# Patient Record
Sex: MALE | Race: WHITE | NOT HISPANIC OR LATINO | URBAN - METROPOLITAN AREA
[De-identification: names, ages, dates, MRNs, and addresses within clinical notes are randomized per-mention and may not be internally consistent; named-entity substitution may affect disease eponyms.]

---

## 2017-10-16 ENCOUNTER — OFFICE VISIT (OUTPATIENT)
Dept: RETAIL CLINIC | Facility: CLINIC | Age: 22
End: 2017-10-16

## 2017-10-16 VITALS
HEART RATE: 86 BPM | HEIGHT: 74 IN | TEMPERATURE: 98.2 F | WEIGHT: 249 LBS | OXYGEN SATURATION: 99 % | SYSTOLIC BLOOD PRESSURE: 110 MMHG | BODY MASS INDEX: 31.95 KG/M2 | DIASTOLIC BLOOD PRESSURE: 72 MMHG | RESPIRATION RATE: 20 BRPM

## 2017-10-16 DIAGNOSIS — J40 BRONCHITIS: ICD-10-CM

## 2017-10-16 DIAGNOSIS — J01.00 ACUTE MAXILLARY SINUSITIS, RECURRENCE NOT SPECIFIED: Primary | ICD-10-CM

## 2017-10-16 PROCEDURE — 99213 OFFICE O/P EST LOW 20 MIN: CPT | Performed by: NURSE PRACTITIONER

## 2017-10-16 RX ORDER — ALBUTEROL SULFATE 90 UG/1
2 AEROSOL, METERED RESPIRATORY (INHALATION) EVERY 4 HOURS PRN
COMMUNITY

## 2017-10-16 RX ORDER — PANTOPRAZOLE SODIUM 40 MG/1
40 TABLET, DELAYED RELEASE ORAL DAILY
COMMUNITY

## 2017-10-16 RX ORDER — AMOXICILLIN AND CLAVULANATE POTASSIUM 875; 125 MG/1; MG/1
1 TABLET, FILM COATED ORAL 2 TIMES DAILY
Qty: 20 TABLET | Refills: 0 | Status: SHIPPED | OUTPATIENT
Start: 2017-10-16 | End: 2017-10-26

## 2017-10-16 NOTE — PATIENT INSTRUCTIONS
Acute Bronchitis  Bronchitis is when the airways that extend from the windpipe into the lungs get red, puffy, and painful (inflamed). Bronchitis often causes thick spit (mucus) to develop. This leads to a cough. A cough is the most common symptom of bronchitis.  In acute bronchitis, the condition usually begins suddenly and goes away over time (usually in 2 weeks). Smoking, allergies, and asthma can make bronchitis worse. Repeated episodes of bronchitis may cause more lung problems.  HOME CARE  · Rest.  · Drink enough fluids to keep your pee (urine) clear or pale yellow (unless you need to limit fluids as told by your doctor).  · Only take over-the-counter or prescription medicines as told by your doctor.  · Avoid smoking and secondhand smoke. These can make bronchitis worse. If you are a smoker, think about using nicotine gum or skin patches. Quitting smoking will help your lungs heal faster.  · Reduce the chance of getting bronchitis again by:    Washing your hands often.    Avoiding people with cold symptoms.    Trying not to touch your hands to your mouth, nose, or eyes.  · Follow up with your doctor as told.  GET HELP IF:  Your symptoms do not improve after 1 week of treatment. Symptoms include:  · Cough.  · Fever.  · Coughing up thick spit.  · Body aches.  · Chest congestion.  · Chills.  · Shortness of breath.  · Sore throat.  GET HELP RIGHT AWAY IF:   · You have an increased fever.  · You have chills.  · You have severe shortness of breath.  · You have bloody thick spit (sputum).  · You throw up (vomit) often.  · You lose too much body fluid (dehydration).  · You have a severe headache.  · You faint.  MAKE SURE YOU:   · Understand these instructions.  · Will watch your condition.  · Will get help right away if you are not doing well or get worse.     This information is not intended to replace advice given to you by your health care provider. Make sure you discuss any questions you have with your health care  provider.     Document Released: 06/05/2009 Document Revised: 08/20/2014 Document Reviewed: 06/10/2014  [employer] Interactive Patient Education ©2017 [employer]  Sinusitis, Adult  Sinusitis is soreness and inflammation of your sinuses. Sinuses are hollow spaces in the bones around your face. They are located:  · Around your eyes.  · In the middle of your forehead.  · Behind your nose.  · In your cheekbones.  Your sinuses and nasal passages are lined with a stringy fluid (mucus). Mucus normally drains out of your sinuses. When your nasal tissues get inflamed or swollen, the mucus can get trapped or blocked so air cannot flow through your sinuses. This lets bacteria, viruses, and funguses grow, and that leads to infection.  HOME CARE  Medicines  · Take, use, or apply over-the-counter and prescription medicines only as told by your doctor. These may include nasal sprays.  · If you were prescribed an antibiotic medicine, take it as told by your doctor. Do not stop taking the antibiotic even if you start to feel better.  Hydrate and Humidify  · Drink enough water to keep your pee (urine) clear or pale yellow.  · Use a cool mist humidifier to keep the humidity level in your home above 50%.  · Breathe in steam for 10-15 minutes, 3-4 times a day or as told by your doctor. You can do this in the bathroom while a hot shower is running.  · Try not to spend time in cool or dry air.  Rest  · Rest as much as possible.    · Sleep with your head raised (elevated).  · Make sure to get enough sleep each night.  General Instructions  · Put a warm, moist washcloth on your face 3-4 times a day or as told by your doctor. This will help with discomfort.  · Wash your hands often with soap and water. If there is no soap and water, use hand .  · Do not smoke. Avoid being around people who are smoking (secondhand smoke).  · Keep all follow-up visits as told by your doctor. This is important.  GET HELP IF:  · You have a  fever.  · Your symptoms get worse.  · Your symptoms do not get better within 10 days.  GET HELP RIGHT AWAY IF:  · You have a very bad headache.  · You cannot stop throwing up (vomiting).  · You have pain or swelling around your face or eyes.  · You have trouble seeing.  · You feel confused.  · Your neck is stiff.  · You have trouble breathing.     This information is not intended to replace advice given to you by your health care provider. Make sure you discuss any questions you have with your health care provider.     Document Released: 06/05/2009 Document Revised: 04/10/2017 Document Reviewed: 10/12/2016  [employer] Interactive Patient Education ©2017 [employer]    Medications and plan of care reviewed with patient and teaching done on medication reactions/side effects. Continue Flonase and inhaler as prescribed.  Rest, plenty of fluids, comfort measures, humidifier, warm salt water gargles, and follow up with PCP if symptoms persist.  If worse go to urgent care or ER as discussed.

## 2017-10-16 NOTE — PROGRESS NOTES
Subjective   Flako Walker is a 21 y.o. male presents for sinus infection and possible bronchitis.   has been sick for over 3 weeks.   went to ER about 2 weeks ago, had CXR, and was diagnosed with sinus infection.   was given steroids and zpak that he completed.   got a little better but not completely.   symptoms has come back worse in sinuses.   lungs are better with inhaler, but still having some cough.   has been taking OTC cold medicine, Flonase, inhaler, and mucinex with little relief. States that he thinks still has a sinus infection and needs a stronger antibiotic.    Sinusitis   This is a new problem. The current episode started 1 to 4 weeks ago. The problem has been gradually worsening since onset. Maximum temperature: low grade . The fever has been present for 3 to 4 days. His pain is at a severity of 6/10. The pain is moderate. Associated symptoms include chills, congestion, coughing (mostly dry with little production), ear pain (bilat ear pain/pressure), headaches (frontal), sinus pressure and a sore throat. Pertinent negatives include no hoarse voice, neck pain, shortness of breath, sneezing or swollen glands. Treatments tried: OTC cold medicine, Flonase, inhaler, and mucinex, took antibiotic and steroids recently. The treatment provided mild relief.   Cough   This is a new problem. The current episode started 1 to 4 weeks ago. The problem has been gradually worsening. The cough is non-productive. Associated symptoms include chills, ear congestion, ear pain (bilat ear pain/pressure), a fever (low grade ,), headaches (frontal), nasal congestion, postnasal drip, rhinorrhea, a sore throat and wheezing. Pertinent negatives include no chest pain, heartburn, hemoptysis, myalgias, rash or shortness of breath. Nothing aggravates the symptoms. Risk factors for lung disease include smoking/tobacco exposure. Treatments tried: OTC cold medicine, Flonase,  inhaler, and mucinex. The treatment provided mild relief. His past medical history is significant for asthma.        The following portions of the patient's history were reviewed and updated as appropriate: allergies, current medications, past family history, past medical history, past social history and past surgical history.    Review of Systems   Constitutional: Positive for chills and fever (low grade ,). Negative for unexpected weight change.   HENT: Positive for congestion, ear pain (bilat ear pain/pressure), postnasal drip, rhinorrhea, sinus pain, sinus pressure and sore throat. Negative for ear discharge, hoarse voice, sneezing, trouble swallowing and voice change.    Eyes: Negative.    Respiratory: Positive for cough (mostly dry with little production) and wheezing. Negative for hemoptysis, chest tightness, shortness of breath and stridor.    Cardiovascular: Negative.  Negative for chest pain.   Gastrointestinal: Negative.  Negative for abdominal pain, constipation, diarrhea, heartburn, nausea and vomiting.   Genitourinary: Negative.    Musculoskeletal: Negative.  Negative for myalgias and neck pain.   Skin: Negative.  Negative for rash.   Neurological: Positive for headaches (frontal).       Objective   Physical Exam   Constitutional: He is oriented to person, place, and time. He appears well-developed and well-nourished. No distress.   HENT:   Head: Normocephalic and atraumatic.   Right Ear: External ear and ear canal normal. Tympanic membrane is not erythematous. A middle ear effusion is present.   Left Ear: External ear and ear canal normal. Tympanic membrane is not erythematous. A middle ear effusion is present.   Nose: Mucosal edema and sinus tenderness present. Right sinus exhibits maxillary sinus tenderness. Left sinus exhibits maxillary sinus tenderness.   Mouth/Throat: Uvula is midline and mucous membranes are normal. No uvula swelling. Posterior oropharyngeal erythema (mild erythema with  PND) present. Tonsils are 0 on the right. Tonsils are 0 on the left. No tonsillar exudate.   Eyes: Conjunctivae and lids are normal. Pupils are equal, round, and reactive to light.   Neck: Normal range of motion.   Cardiovascular: Normal rate, regular rhythm and normal heart sounds.    No murmur heard.  Pulmonary/Chest: Effort normal and breath sounds normal. No respiratory distress. He has no wheezes.   Lymphadenopathy:     He has no cervical adenopathy.   Neurological: He is alert and oriented to person, place, and time.   Skin: Skin is warm and dry. No rash noted.   Psychiatric: He has a normal mood and affect. His speech is normal and behavior is normal. Judgment and thought content normal.       Assessment/Plan   Flako was seen today for sinusitis and cough.    Diagnoses and all orders for this visit:    Acute maxillary sinusitis, recurrence not specified  Bronchitis  -     amoxicillin-clavulanate (AUGMENTIN) 875-125 MG per tablet; Take 1 tablet by mouth 2 (Two) Times a Day for 10 days.  -     loratadine-pseudoephedrine (CLARITIN-D 24 HOUR)  MG per 24 hr tablet; Take 1 tablet by mouth Daily for 10 days.    Medications and plan of care reviewed with patient and teaching done on medication reactions/side effects. Continue Flonase, cough medicine, and inhaler as prescribed. Do not take OTC medications while taking these medications.  Try to stop smoking, use resources as discussed.  Rest, plenty of fluids, comfort measures, humidifier, warm salt water gargles, and follow up with PCP if symptoms persist.  If worse go to urgent care or ER as discussed.  Patient verbalized understanding.    MAYA Mortensen